# Patient Record
Sex: MALE | Race: WHITE
[De-identification: names, ages, dates, MRNs, and addresses within clinical notes are randomized per-mention and may not be internally consistent; named-entity substitution may affect disease eponyms.]

---

## 2018-12-12 ENCOUNTER — HOSPITAL ENCOUNTER (OUTPATIENT)
Dept: HOSPITAL 92 - TBSIIMAG | Age: 28
Discharge: HOME | End: 2018-12-12
Attending: ORTHOPAEDIC SURGERY
Payer: COMMERCIAL

## 2018-12-12 DIAGNOSIS — M23.91: Primary | ICD-10-CM

## 2018-12-12 NOTE — MRI
MRI OF THE RIGHT KNEE:

 

DATE: 12/12/2018.

 

PROVIDED CLINICAL HISTORY: 

Right knee pain.

 

FINDINGS: 

The anterior cruciate ligament, posterior cruciate ligament, medial collateral ligament, and lateral 
collateral ligamentous complex demonstrate an intact MR appearance, as does the extensor mechanism.

 

The medial and lateral menisci demonstrate no evidence for a tear.

 

No focal articular cartilage defect is apparent.

 

The amount of fluid within the knee joint appears physiologic.

 

No focal concerning regional marrow or muscular signal abnormality apparent.

 

IMPRESSION: 

No evidence for internal derangement.

 

POS: [facility]